# Patient Record
Sex: MALE | Race: OTHER | Employment: FULL TIME | URBAN - METROPOLITAN AREA
[De-identification: names, ages, dates, MRNs, and addresses within clinical notes are randomized per-mention and may not be internally consistent; named-entity substitution may affect disease eponyms.]

---

## 2017-01-17 ENCOUNTER — TELEPHONE (OUTPATIENT)
Dept: SURGERY | Facility: CLINIC | Age: 62
End: 2017-01-17

## 2017-01-18 NOTE — TELEPHONE ENCOUNTER
Returned pt's call and spoke with wife. She states she is the one who called. She is requesting PSA results. States her  asked her to call, as they have not heard from our clinic. Read to her result as outlined below.  She verbalized understanding a

## 2017-01-19 NOTE — TELEPHONE ENCOUNTER
I dictated and had letter printed on printer at my copy and sent nurses message that they need to send letter from that printer; I am repeating the message below.   Please make sure that staff, when they open and find instructions to send letter, that they

## 2017-05-19 ENCOUNTER — OFFICE VISIT (OUTPATIENT)
Dept: SURGERY | Facility: CLINIC | Age: 62
End: 2017-05-19

## 2017-05-19 VITALS
SYSTOLIC BLOOD PRESSURE: 110 MMHG | BODY MASS INDEX: 24.44 KG/M2 | TEMPERATURE: 98 F | HEART RATE: 54 BPM | DIASTOLIC BLOOD PRESSURE: 70 MMHG | RESPIRATION RATE: 16 BRPM | HEIGHT: 69 IN | WEIGHT: 165 LBS

## 2017-05-19 DIAGNOSIS — K40.20 BILATERAL INGUINAL HERNIA WITHOUT OBSTRUCTION OR GANGRENE, RECURRENCE NOT SPECIFIED: ICD-10-CM

## 2017-05-19 DIAGNOSIS — N40.1 BENIGN NON-NODULAR PROSTATIC HYPERPLASIA WITH LOWER URINARY TRACT SYMPTOMS: Primary | ICD-10-CM

## 2017-05-19 DIAGNOSIS — R31.29 MICROHEMATURIA: ICD-10-CM

## 2017-05-19 DIAGNOSIS — R35.1 NOCTURIA: ICD-10-CM

## 2017-05-19 PROCEDURE — 99214 OFFICE O/P EST MOD 30 MIN: CPT | Performed by: UROLOGY

## 2017-05-19 PROCEDURE — 99212 OFFICE O/P EST SF 10 MIN: CPT | Performed by: UROLOGY

## 2017-05-19 RX ORDER — TAMSULOSIN HYDROCHLORIDE 0.4 MG/1
0.8 CAPSULE ORAL DAILY
Qty: 180 CAPSULE | Refills: 3 | Status: SHIPPED | OUTPATIENT
Start: 2017-05-19 | End: 2017-06-18

## 2017-05-19 NOTE — PATIENT INSTRUCTIONS
1.  During the next 2 weeks, urine specimen for complete urinalysis and urine for cytology to investigate microscopic blood in the urine    2. Increase tamsulosin 0.4 mg to  1 capsule 2 times daily    3.   You decided not to take finasteride and we will ob

## 2017-05-21 NOTE — PROGRESS NOTES
Russell Turner is a 64year old male. HPI:   No chief complaint on file. History provided by patient and review of chart    Voiding dysfunction--he has a weak stream.  He has nocturia once a night.   Denies urinary frequency less than every 2 evaristo History:   Smoking Status: Never Smoker                      Alcohol Use: No                 Comment: occ       Medications (Active prior to today's visit):    Current Outpatient Prescriptions:  tamsulosin HCl 0.4 MG Oral Cap Take 2 capsules (0.8 mg total) blood, however, RBC 1 and WBC <1.   I explained to patient significance of microhematuria by dipstick versus number of red blood cells; I explained treatment options of reevaluating microscopy and if RBC is 3 or more per high-power field, as per guidelines screening, and he understands. He wants to get a PSA in a year. Bilateral inguinal hernias  Asymptomatic. He wants to be observed. RECOMMENDATIONS AND TREATMENT PLAN ---    1.   During the next 2 weeks, urine specimen for complete urinalysis and uri

## 2017-06-06 ENCOUNTER — APPOINTMENT (OUTPATIENT)
Dept: LAB | Facility: HOSPITAL | Age: 62
End: 2017-06-06
Attending: UROLOGY
Payer: COMMERCIAL

## 2017-06-06 DIAGNOSIS — R35.1 NOCTURIA: ICD-10-CM

## 2017-06-06 DIAGNOSIS — R31.29 MICROHEMATURIA: ICD-10-CM

## 2017-06-06 PROCEDURE — 88108 CYTOPATH CONCENTRATE TECH: CPT

## 2017-06-06 PROCEDURE — 81001 URINALYSIS AUTO W/SCOPE: CPT

## 2018-02-03 ENCOUNTER — OFFICE VISIT (OUTPATIENT)
Dept: INTERNAL MEDICINE CLINIC | Facility: CLINIC | Age: 63
End: 2018-02-03

## 2018-02-03 VITALS
WEIGHT: 184.81 LBS | DIASTOLIC BLOOD PRESSURE: 80 MMHG | TEMPERATURE: 97 F | SYSTOLIC BLOOD PRESSURE: 137 MMHG | BODY MASS INDEX: 27.37 KG/M2 | HEART RATE: 57 BPM | HEIGHT: 69 IN | RESPIRATION RATE: 20 BRPM

## 2018-02-03 DIAGNOSIS — L30.9 DERMATITIS: ICD-10-CM

## 2018-02-03 DIAGNOSIS — Z00.00 PHYSICAL EXAM, ANNUAL: Primary | ICD-10-CM

## 2018-02-03 DIAGNOSIS — H61.22 IMPACTED CERUMEN OF LEFT EAR: ICD-10-CM

## 2018-02-03 PROBLEM — I25.10 CORONARY ARTERY DISEASE INVOLVING NATIVE CORONARY ARTERY OF NATIVE HEART WITHOUT ANGINA PECTORIS: Status: ACTIVE | Noted: 2018-02-03

## 2018-02-03 PROCEDURE — 99386 PREV VISIT NEW AGE 40-64: CPT | Performed by: INTERNAL MEDICINE

## 2018-02-03 PROCEDURE — 69210 REMOVE IMPACTED EAR WAX UNI: CPT | Performed by: INTERNAL MEDICINE

## 2018-02-03 RX ORDER — BETAMETHASONE DIPROPIONATE 0.5 MG/G
CREAM TOPICAL
Qty: 45 G | Refills: 0 | Status: SHIPPED | OUTPATIENT
Start: 2018-02-03

## 2018-02-03 NOTE — PROGRESS NOTES
HPI:    Patient ID: Ngoc Cortez is a 58year old male. HPI  Patient reports that he is feeling well, he suffered MI 7 years ago and continues to see cardiologist since that time. He has no chest pain or shortness of breath.   Around [employer]:      Allergies:Not on File   /80 (BP Location: Left arm, Patient Position: Sitting)   Pulse 57   Temp (!) 97.3 °F (36.3 °C) (Oral)   Resp 20   Ht 5' 9\" (1.753 m)   Wt 184 lb 12.8 oz (83.8 kg)   BMI 27.29 kg/m²    Physical Exam    Constitutional Lipid Panel [E]      TSH W Reflex To Free T4 [E]      Flulaval 0.5 ml 6 mon and older Quad single dose PF (91500)    Meds This Visit:  Signed Prescriptions Disp Refills    betamethasone dipropionate 0.05 % External Cream 45 g 0      Sig: Apply twice a day

## 2018-03-07 ENCOUNTER — TELEPHONE (OUTPATIENT)
Dept: INTERNAL MEDICINE CLINIC | Facility: CLINIC | Age: 63
End: 2018-03-07

## 2018-03-08 NOTE — TELEPHONE ENCOUNTER
Spoke to wife, she will give message to the patient that all blood work is normal, copy of the test results mailed to the patient home address.

## 2018-12-31 ENCOUNTER — HOSPITAL ENCOUNTER (OUTPATIENT)
Dept: GENERAL RADIOLOGY | Age: 63
Discharge: HOME | End: 2018-12-31
Attending: INTERNAL MEDICINE
Payer: OTHER MISCELLANEOUS

## 2018-12-31 ENCOUNTER — OFFICE VISIT (OUTPATIENT)
Dept: INTERNAL MEDICINE CLINIC | Facility: CLINIC | Age: 63
End: 2018-12-31
Payer: OTHER MISCELLANEOUS

## 2018-12-31 VITALS
BODY MASS INDEX: 30.13 KG/M2 | DIASTOLIC BLOOD PRESSURE: 87 MMHG | TEMPERATURE: 98 F | WEIGHT: 192 LBS | SYSTOLIC BLOOD PRESSURE: 141 MMHG | HEIGHT: 67 IN | HEART RATE: 80 BPM | RESPIRATION RATE: 20 BRPM

## 2018-12-31 DIAGNOSIS — Z01.818 PREOP EXAMINATION: Primary | ICD-10-CM

## 2018-12-31 DIAGNOSIS — M67.911 DISORDER OF RIGHT ROTATOR CUFF: ICD-10-CM

## 2018-12-31 DIAGNOSIS — Z01.818 PREOP EXAMINATION: ICD-10-CM

## 2018-12-31 DIAGNOSIS — I25.10 CORONARY ARTERY DISEASE INVOLVING NATIVE CORONARY ARTERY OF NATIVE HEART WITHOUT ANGINA PECTORIS: ICD-10-CM

## 2018-12-31 PROCEDURE — 71046 X-RAY EXAM CHEST 2 VIEWS: CPT | Performed by: INTERNAL MEDICINE

## 2018-12-31 PROCEDURE — 99242 OFF/OP CONSLTJ NEW/EST SF 20: CPT | Performed by: INTERNAL MEDICINE

## 2018-12-31 PROCEDURE — 99212 OFFICE O/P EST SF 10 MIN: CPT | Performed by: INTERNAL MEDICINE

## 2018-12-31 NOTE — PROGRESS NOTES
HPI:    Patient ID: An Clemons is a 61year old male. Presents for preoperative evaluation. HPI  Patient reports that in October he suffered injury at work ,started to experience right shoulder pain.   He was evaluated by orthopedic specialist  Negative for pruritus and rash  Musculoskeletal: Positive for bone/joint symptoms  Neurological:  Negative for gait disturbance, paresthesias  Psychiatric:  Negative for inappropriate interaction and psychiatric symptoms          Current Outpatient Medicat Judgment normal.          ASSESSMENT/PLAN:   Preop examination  (primary encounter diagnosis) normal BMP and chest x-ray      Coronary artery disease without angina pectoris abnormal EKG, patient needs cardiac clearance     Rotator cuff disorder proceed wi

## 2019-01-02 ENCOUNTER — TELEPHONE (OUTPATIENT)
Dept: INTERNAL MEDICINE CLINIC | Facility: CLINIC | Age: 64
End: 2019-01-02

## 2019-01-03 NOTE — TELEPHONE ENCOUNTER
Spoke to wife, reported the chest x-ray is normal, BMP was normal EKG looked abnormal, patient has appointment with cardiologist in 2 days advised that surgery will be done only after cardiology clearance obtained.   I spoke to Ebenezer Akhtar at Dr. Macho hernandez

## 2019-01-04 ENCOUNTER — TELEPHONE (OUTPATIENT)
Dept: INTERNAL MEDICINE CLINIC | Facility: CLINIC | Age: 64
End: 2019-01-04

## 2019-01-29 ENCOUNTER — TELEPHONE (OUTPATIENT)
Dept: INTERNAL MEDICINE CLINIC | Facility: CLINIC | Age: 64
End: 2019-01-29

## 2019-01-29 NOTE — TELEPHONE ENCOUNTER
To: Dr. Violeta Simon - Workers Comp  calling about pt. Please call her back: [phone]. No forms recvd in Forms Dept.   Thank you,  [facility]

## 2019-02-04 NOTE — TELEPHONE ENCOUNTER
Spoke to Raghav Espana at [facility].  department, they ready to provide necessary information so preop evaluation Mobile gets addressed,

## 2019-02-04 NOTE — TELEPHONE ENCOUNTER
I am not in chrtge of  His care  He is under care ofo rthopedic  Specialist , I saw him only for preop visit per  Request of ortho , please give them a call

## 2019-02-05 NOTE — TELEPHONE ENCOUNTER
Spoke to Marcelino, states the [employer] comp  can call and they can help with bill for pre-op visit. States they dont call out to workers comp, case manger needs to [facility].      Spoke to Jayda, very rudely stated she is a very busy lady and castro

## 2020-11-09 ENCOUNTER — TELEPHONE (OUTPATIENT)
Dept: INTERNAL MEDICINE CLINIC | Facility: CLINIC | Age: 65
End: 2020-11-09

## 2020-11-09 NOTE — TELEPHONE ENCOUNTER
Wife calling for spouse. Wife was able to schedule a video visit for herself but states  is at work all day with no break period which will allow him to schedule a video visit for himself. Wife and  would like to rule out Covid.  Per spouse, p

## 2020-11-09 NOTE — TELEPHONE ENCOUNTER
I have not seen this patient in almost 2 years, and I would like to have D[employer]im[employer] visit with him.   He can go to any community testing centers at his convenience, testing for [employer] usually happens during the day, if he cannot take time off work f

## 2020-11-10 NOTE — TELEPHONE ENCOUNTER
Spoke to Ana and she states he is headed to the ER to [facility]. He fainted at work and he will be seen there.      ROSALVA

## 2020-11-13 ENCOUNTER — TELEPHONE (OUTPATIENT)
Dept: INTERNAL MEDICINE CLINIC | Facility: CLINIC | Age: 65
End: 2020-11-13

## 2020-11-13 NOTE — TELEPHONE ENCOUNTER
Spoke to wife  , gave pt appt  For  Friday  4;40pm, mailed orsed  For labs  And MRI  BRAIN, advised to see neurologist if  Dizziness continues

## 2020-12-19 ENCOUNTER — TELEPHONE (OUTPATIENT)
Dept: INTERNAL MEDICINE CLINIC | Facility: CLINIC | Age: 65
End: 2020-12-19

## 2021-01-25 ENCOUNTER — TELEPHONE (OUTPATIENT)
Dept: INTERNAL MEDICINE CLINIC | Facility: CLINIC | Age: 66
End: 2021-01-25

## 2021-01-25 NOTE — TELEPHONE ENCOUNTER
Spoke with pt.relayed Dr. Gonzalo Beckett results note:  Negative test, no blood in stool . Pt. Wife Marsha Fragoso verbalized understanding.

## 2023-02-20 ENCOUNTER — OFFICE VISIT (OUTPATIENT)
Dept: INTERNAL MEDICINE CLINIC | Facility: CLINIC | Age: 68
End: 2023-02-20

## 2023-02-20 VITALS
SYSTOLIC BLOOD PRESSURE: 130 MMHG | WEIGHT: 193 LBS | DIASTOLIC BLOOD PRESSURE: 78 MMHG | HEART RATE: 68 BPM | HEIGHT: 66 IN | BODY MASS INDEX: 31.02 KG/M2

## 2023-02-20 DIAGNOSIS — H61.22 HEARING LOSS OF LEFT EAR DUE TO CERUMEN IMPACTION: ICD-10-CM

## 2023-02-20 DIAGNOSIS — I25.10 CORONARY ARTERY DISEASE INVOLVING NATIVE CORONARY ARTERY OF NATIVE HEART WITHOUT ANGINA PECTORIS: Primary | ICD-10-CM

## 2023-02-20 DIAGNOSIS — E78.49 OTHER HYPERLIPIDEMIA: ICD-10-CM

## 2023-02-20 DIAGNOSIS — H91.8X1 OTHER HEARING LOSS OF RIGHT EAR WITH UNRESTRICTED HEARING OF LEFT EAR: ICD-10-CM

## 2023-02-20 DIAGNOSIS — Z12.5 PROSTATE CANCER SCREENING: ICD-10-CM

## 2023-02-20 DIAGNOSIS — M72.2 PLANTAR FASCIAL FIBROMATOSIS: ICD-10-CM

## 2023-02-20 PROBLEM — I25.84 CORONARY ARTERY DISEASE DUE TO CALCIFIED CORONARY LESION: Status: ACTIVE | Noted: 2018-02-03

## 2023-02-20 PROCEDURE — 90677 PCV20 VACCINE IM: CPT | Performed by: INTERNAL MEDICINE

## 2023-02-20 PROCEDURE — 99203 OFFICE O/P NEW LOW 30 MIN: CPT | Performed by: INTERNAL MEDICINE

## 2023-02-20 PROCEDURE — 1126F AMNT PAIN NOTED NONE PRSNT: CPT | Performed by: INTERNAL MEDICINE

## 2023-02-20 PROCEDURE — G0009 ADMIN PNEUMOCOCCAL VACCINE: HCPCS | Performed by: INTERNAL MEDICINE

## 2023-02-20 PROCEDURE — 3075F SYST BP GE 130 - 139MM HG: CPT | Performed by: INTERNAL MEDICINE

## 2023-02-20 PROCEDURE — 3078F DIAST BP <80 MM HG: CPT | Performed by: INTERNAL MEDICINE

## 2023-02-20 PROCEDURE — 3008F BODY MASS INDEX DOCD: CPT | Performed by: INTERNAL MEDICINE

## 2023-02-21 LAB
ABSOLUTE BASOPHILS: 62 CELLS/UL (ref 0–200)
ABSOLUTE EOSINOPHILS: 168 CELLS/UL (ref 15–500)
ABSOLUTE LYMPHOCYTES: 1758 CELLS/UL (ref 850–3900)
ABSOLUTE MONOCYTES: 426 CELLS/UL (ref 200–950)
ABSOLUTE NEUTROPHILS: 3186 CELLS/UL (ref 1500–7800)
ALBUMIN/GLOBULIN RATIO: 1.6 (CALC) (ref 1–2.5)
ALBUMIN: 4.2 G/DL (ref 3.6–5.1)
ALKALINE PHOSPHATASE: 57 U/L (ref 35–144)
ALT: 28 U/L (ref 9–46)
AST: 26 U/L (ref 10–35)
BASOPHILS: 1.1 %
BILIRUBIN, TOTAL: 0.6 MG/DL (ref 0.2–1.2)
BUN: 20 MG/DL (ref 7–25)
CALCIUM: 9.2 MG/DL (ref 8.6–10.3)
CARBON DIOXIDE: 28 MMOL/L (ref 20–32)
CHLORIDE: 105 MMOL/L (ref 98–110)
CHOL/HDLC RATIO: 2.8 (CALC)
CHOLESTEROL, TOTAL: 120 MG/DL
CREATININE: 1.03 MG/DL (ref 0.7–1.35)
EGFR: 80 ML/MIN/1.73M2
EOSINOPHILS: 3 %
GLOBULIN: 2.6 G/DL (CALC) (ref 1.9–3.7)
GLUCOSE: 91 MG/DL (ref 65–99)
HDL CHOLESTEROL: 43 MG/DL
HEMATOCRIT: 45.4 % (ref 38.5–50)
HEMOGLOBIN: 15.3 G/DL (ref 13.2–17.1)
LDL-CHOLESTEROL: 56 MG/DL (CALC)
LYMPHOCYTES: 31.4 %
MCH: 30.8 PG (ref 27–33)
MCHC: 33.7 G/DL (ref 32–36)
MCV: 91.3 FL (ref 80–100)
MONOCYTES: 7.6 %
MPV: 9.6 FL (ref 7.5–12.5)
NEUTROPHILS: 56.9 %
NON-HDL CHOLESTEROL: 77 MG/DL (CALC)
PLATELET COUNT: 257 THOUSAND/UL (ref 140–400)
POTASSIUM: 4.5 MMOL/L (ref 3.5–5.3)
PROTEIN, TOTAL: 6.8 G/DL (ref 6.1–8.1)
RDW: 12.5 % (ref 11–15)
RED BLOOD CELL COUNT: 4.97 MILLION/UL (ref 4.2–5.8)
SODIUM: 139 MMOL/L (ref 135–146)
TOTAL PSA: 2.5 NG/ML
TRIGLYCERIDES: 133 MG/DL
WHITE BLOOD CELL COUNT: 5.6 THOUSAND/UL (ref 3.8–10.8)

## 2023-05-04 ENCOUNTER — TELEPHONE (OUTPATIENT)
Dept: INTERNAL MEDICINE CLINIC | Facility: CLINIC | Age: 68
End: 2023-05-04

## 2023-05-04 NOTE — TELEPHONE ENCOUNTER
Spoke to patient's wife regarding patient's cologuard negative result. Patient's wife verbalized understanding and states patient knew result already but will still be informing him of phone call.

## 2023-10-30 ENCOUNTER — OFFICE VISIT (OUTPATIENT)
Dept: INTERNAL MEDICINE CLINIC | Facility: CLINIC | Age: 68
End: 2023-10-30
Payer: MEDICARE

## 2023-10-30 VITALS
WEIGHT: 245 LBS | OXYGEN SATURATION: 98 % | HEART RATE: 70 BPM | DIASTOLIC BLOOD PRESSURE: 84 MMHG | BODY MASS INDEX: 37.13 KG/M2 | SYSTOLIC BLOOD PRESSURE: 124 MMHG | HEIGHT: 68 IN

## 2023-10-30 DIAGNOSIS — I25.10 CORONARY ARTERY DISEASE INVOLVING NATIVE CORONARY ARTERY OF NATIVE HEART WITHOUT ANGINA PECTORIS: ICD-10-CM

## 2023-10-30 DIAGNOSIS — Z00.00 MEDICARE ANNUAL WELLNESS VISIT, SUBSEQUENT: Primary | ICD-10-CM

## 2023-10-30 DIAGNOSIS — K40.90 NON-RECURRENT UNILATERAL INGUINAL HERNIA WITHOUT OBSTRUCTION OR GANGRENE: ICD-10-CM

## 2023-10-30 DIAGNOSIS — E78.49 OTHER HYPERLIPIDEMIA: ICD-10-CM

## 2023-10-30 DIAGNOSIS — H91.8X1 OTHER HEARING LOSS OF RIGHT EAR WITH UNRESTRICTED HEARING OF LEFT EAR: ICD-10-CM

## 2023-10-30 PROCEDURE — 1125F AMNT PAIN NOTED PAIN PRSNT: CPT | Performed by: INTERNAL MEDICINE

## 2023-10-30 PROCEDURE — 3079F DIAST BP 80-89 MM HG: CPT | Performed by: INTERNAL MEDICINE

## 2023-10-30 PROCEDURE — 96160 PT-FOCUSED HLTH RISK ASSMT: CPT | Performed by: INTERNAL MEDICINE

## 2023-10-30 PROCEDURE — 3074F SYST BP LT 130 MM HG: CPT | Performed by: INTERNAL MEDICINE

## 2023-10-30 PROCEDURE — G0438 PPPS, INITIAL VISIT: HCPCS | Performed by: INTERNAL MEDICINE

## 2023-10-30 PROCEDURE — 3008F BODY MASS INDEX DOCD: CPT | Performed by: INTERNAL MEDICINE

## 2023-10-30 PROCEDURE — 1159F MED LIST DOCD IN RCRD: CPT | Performed by: INTERNAL MEDICINE

## 2023-10-30 PROCEDURE — 1170F FXNL STATUS ASSESSED: CPT | Performed by: INTERNAL MEDICINE

## 2023-11-07 ENCOUNTER — OFFICE VISIT (OUTPATIENT)
Dept: SURGERY | Facility: CLINIC | Age: 68
End: 2023-11-07

## 2023-11-07 VITALS — BODY MASS INDEX: 37 KG/M2 | WEIGHT: 245 LBS

## 2023-11-07 DIAGNOSIS — K40.90 NON-RECURRENT UNILATERAL INGUINAL HERNIA WITHOUT OBSTRUCTION OR GANGRENE: Primary | ICD-10-CM

## 2023-11-07 PROCEDURE — 1160F RVW MEDS BY RX/DR IN RCRD: CPT | Performed by: SURGERY

## 2023-11-07 PROCEDURE — 1159F MED LIST DOCD IN RCRD: CPT | Performed by: SURGERY

## 2023-11-07 PROCEDURE — 99202 OFFICE O/P NEW SF 15 MIN: CPT | Performed by: SURGERY

## 2023-11-07 PROCEDURE — 1125F AMNT PAIN NOTED PAIN PRSNT: CPT | Performed by: SURGERY

## 2023-11-20 ENCOUNTER — TELEPHONE (OUTPATIENT)
Dept: SURGERY | Facility: CLINIC | Age: 68
End: 2023-11-20

## 2023-12-01 PROBLEM — I21.9 HEART ATTACK (HCC): Status: ACTIVE | Noted: 2023-12-01

## 2023-12-01 RX ORDER — CHOLECALCIFEROL (VITAMIN D3) 125 MCG
2000 CAPSULE ORAL DAILY
COMMUNITY

## 2023-12-02 LAB
ABSOLUTE BASOPHILS: 73 CELLS/UL (ref 0–200)
ABSOLUTE EOSINOPHILS: 213 CELLS/UL (ref 15–500)
ABSOLUTE LYMPHOCYTES: 2369 CELLS/UL (ref 850–3900)
ABSOLUTE MONOCYTES: 442 CELLS/UL (ref 200–950)
ABSOLUTE NEUTROPHILS: 2503 CELLS/UL (ref 1500–7800)
ALBUMIN/GLOBULIN RATIO: 1.6 (CALC) (ref 1–2.5)
ALBUMIN: 3.9 G/DL (ref 3.6–5.1)
ALKALINE PHOSPHATASE: 59 U/L (ref 35–144)
ALT: 24 U/L (ref 9–46)
AST: 23 U/L (ref 10–35)
BASOPHILS: 1.3 %
BILIRUBIN, TOTAL: 0.4 MG/DL (ref 0.2–1.2)
BUN: 18 MG/DL (ref 7–25)
CALCIUM: 9.9 MG/DL (ref 8.6–10.3)
CARBON DIOXIDE: 28 MMOL/L (ref 20–32)
CHLORIDE: 106 MMOL/L (ref 98–110)
CHOL/HDLC RATIO: 2.7 (CALC)
CHOLESTEROL, TOTAL: 113 MG/DL
CREATININE: 0.98 MG/DL (ref 0.7–1.35)
EGFR: 84 ML/MIN/1.73M2
EOSINOPHILS: 3.8 %
GLOBULIN: 2.5 G/DL (CALC) (ref 1.9–3.7)
GLUCOSE: 95 MG/DL (ref 65–139)
HDL CHOLESTEROL: 42 MG/DL
HEMATOCRIT: 44.2 % (ref 38.5–50)
HEMOGLOBIN: 14.4 G/DL (ref 13.2–17.1)
LDL-CHOLESTEROL: 51 MG/DL (CALC)
LYMPHOCYTES: 42.3 %
MCH: 30.4 PG (ref 27–33)
MCHC: 32.6 G/DL (ref 32–36)
MCV: 93.2 FL (ref 80–100)
MONOCYTES: 7.9 %
MPV: 9.7 FL (ref 7.5–12.5)
NEUTROPHILS: 44.7 %
NON-HDL CHOLESTEROL: 71 MG/DL (CALC)
PLATELET COUNT: 238 THOUSAND/UL (ref 140–400)
POTASSIUM: 4.5 MMOL/L (ref 3.5–5.3)
PROTEIN, TOTAL: 6.4 G/DL (ref 6.1–8.1)
RDW: 12.7 % (ref 11–15)
RED BLOOD CELL COUNT: 4.74 MILLION/UL (ref 4.2–5.8)
SODIUM: 141 MMOL/L (ref 135–146)
TRIGLYCERIDES: 123 MG/DL
WHITE BLOOD CELL COUNT: 5.6 THOUSAND/UL (ref 3.8–10.8)

## 2023-12-06 ENCOUNTER — HOSPITAL ENCOUNTER (OUTPATIENT)
Facility: HOSPITAL | Age: 68
Setting detail: HOSPITAL OUTPATIENT SURGERY
Discharge: HOME | End: 2023-12-06
Attending: SURGERY | Admitting: SURGERY
Payer: MEDICARE

## 2023-12-06 ENCOUNTER — ANESTHESIA (OUTPATIENT)
Dept: SURGERY | Facility: HOSPITAL | Age: 68
End: 2023-12-06
Payer: MEDICARE

## 2023-12-06 ENCOUNTER — ANESTHESIA EVENT (OUTPATIENT)
Dept: SURGERY | Facility: HOSPITAL | Age: 68
End: 2023-12-06
Payer: MEDICARE

## 2023-12-06 VITALS
DIASTOLIC BLOOD PRESSURE: 79 MMHG | OXYGEN SATURATION: 98 % | RESPIRATION RATE: 12 BRPM | TEMPERATURE: 98 F | HEART RATE: 49 BPM | BODY MASS INDEX: 30.13 KG/M2 | HEIGHT: 67 IN | SYSTOLIC BLOOD PRESSURE: 134 MMHG | WEIGHT: 192 LBS

## 2023-12-06 PROCEDURE — 49505 PRP I/HERN INIT REDUC >5 YR: CPT | Performed by: SURGERY

## 2023-12-06 PROCEDURE — 0YU50JZ SUPPLEMENT RIGHT INGUINAL REGION WITH SYNTHETIC SUBSTITUTE, OPEN APPROACH: ICD-10-PCS | Performed by: SURGERY

## 2023-12-06 DEVICE — BARD MESH
Type: IMPLANTABLE DEVICE | Status: FUNCTIONAL
Brand: BARD MESH

## 2023-12-06 RX ORDER — FAMOTIDINE 20 MG/1
20 TABLET, FILM COATED ORAL ONCE
Status: COMPLETED | OUTPATIENT
Start: 2023-12-06 | End: 2023-12-06

## 2023-12-06 RX ORDER — NALOXONE HYDROCHLORIDE 0.4 MG/ML
0.08 INJECTION, SOLUTION INTRAMUSCULAR; INTRAVENOUS; SUBCUTANEOUS AS NEEDED
OUTPATIENT
Start: 2023-12-06 | End: 2023-12-06

## 2023-12-06 RX ORDER — MORPHINE SULFATE 10 MG/ML
6 INJECTION, SOLUTION INTRAMUSCULAR; INTRAVENOUS EVERY 10 MIN PRN
OUTPATIENT
Start: 2023-12-06

## 2023-12-06 RX ORDER — DEXAMETHASONE SODIUM PHOSPHATE 4 MG/ML
VIAL (ML) INJECTION AS NEEDED
Status: DISCONTINUED | OUTPATIENT
Start: 2023-12-06 | End: 2023-12-06 | Stop reason: SURG

## 2023-12-06 RX ORDER — SODIUM CHLORIDE, SODIUM LACTATE, POTASSIUM CHLORIDE, CALCIUM CHLORIDE 600; 310; 30; 20 MG/100ML; MG/100ML; MG/100ML; MG/100ML
INJECTION, SOLUTION INTRAVENOUS CONTINUOUS
Status: DISCONTINUED | OUTPATIENT
Start: 2023-12-06 | End: 2023-12-06

## 2023-12-06 RX ORDER — BUPIVACAINE HYDROCHLORIDE 5 MG/ML
INJECTION, SOLUTION EPIDURAL; INTRACAUDAL AS NEEDED
Status: DISCONTINUED | OUTPATIENT
Start: 2023-12-06 | End: 2023-12-06 | Stop reason: HOSPADM

## 2023-12-06 RX ORDER — HYDROMORPHONE HYDROCHLORIDE 1 MG/ML
0.2 INJECTION, SOLUTION INTRAMUSCULAR; INTRAVENOUS; SUBCUTANEOUS EVERY 5 MIN PRN
OUTPATIENT
Start: 2023-12-06

## 2023-12-06 RX ORDER — HYDROMORPHONE HYDROCHLORIDE 1 MG/ML
0.4 INJECTION, SOLUTION INTRAMUSCULAR; INTRAVENOUS; SUBCUTANEOUS EVERY 5 MIN PRN
OUTPATIENT
Start: 2023-12-06

## 2023-12-06 RX ORDER — SODIUM CHLORIDE, SODIUM LACTATE, POTASSIUM CHLORIDE, CALCIUM CHLORIDE 600; 310; 30; 20 MG/100ML; MG/100ML; MG/100ML; MG/100ML
INJECTION, SOLUTION INTRAVENOUS CONTINUOUS
OUTPATIENT
Start: 2023-12-06

## 2023-12-06 RX ORDER — ONDANSETRON 2 MG/ML
INJECTION INTRAMUSCULAR; INTRAVENOUS AS NEEDED
Status: DISCONTINUED | OUTPATIENT
Start: 2023-12-06 | End: 2023-12-06 | Stop reason: SURG

## 2023-12-06 RX ORDER — ROCURONIUM BROMIDE 10 MG/ML
INJECTION, SOLUTION INTRAVENOUS AS NEEDED
Status: DISCONTINUED | OUTPATIENT
Start: 2023-12-06 | End: 2023-12-06 | Stop reason: SURG

## 2023-12-06 RX ORDER — ACETAMINOPHEN AND CODEINE PHOSPHATE 300; 30 MG/1; MG/1
1-2 TABLET ORAL EVERY 4 HOURS PRN
Qty: 30 TABLET | Refills: 0 | Status: SHIPPED | OUTPATIENT
Start: 2023-12-06

## 2023-12-06 RX ORDER — METOCLOPRAMIDE HYDROCHLORIDE 5 MG/ML
10 INJECTION INTRAMUSCULAR; INTRAVENOUS EVERY 8 HOURS PRN
OUTPATIENT
Start: 2023-12-06

## 2023-12-06 RX ORDER — MORPHINE SULFATE 4 MG/ML
4 INJECTION, SOLUTION INTRAMUSCULAR; INTRAVENOUS EVERY 10 MIN PRN
OUTPATIENT
Start: 2023-12-06

## 2023-12-06 RX ORDER — ONDANSETRON 2 MG/ML
4 INJECTION INTRAMUSCULAR; INTRAVENOUS EVERY 6 HOURS PRN
OUTPATIENT
Start: 2023-12-06

## 2023-12-06 RX ORDER — POLYETHYLENE GLYCOL 3350 17 G/17G
17 POWDER, FOR SOLUTION ORAL DAILY
Qty: 14 PACKET | Refills: 0 | Status: SHIPPED | OUTPATIENT
Start: 2023-12-06 | End: 2023-12-20

## 2023-12-06 RX ORDER — GLYCOPYRROLATE 0.2 MG/ML
INJECTION, SOLUTION INTRAMUSCULAR; INTRAVENOUS AS NEEDED
Status: DISCONTINUED | OUTPATIENT
Start: 2023-12-06 | End: 2023-12-06 | Stop reason: SURG

## 2023-12-06 RX ORDER — EPHEDRINE SULFATE 50 MG/ML
INJECTION, SOLUTION INTRAVENOUS AS NEEDED
Status: DISCONTINUED | OUTPATIENT
Start: 2023-12-06 | End: 2023-12-06 | Stop reason: SURG

## 2023-12-06 RX ORDER — ACETAMINOPHEN 500 MG
1000 TABLET ORAL ONCE
Status: COMPLETED | OUTPATIENT
Start: 2023-12-06 | End: 2023-12-06

## 2023-12-06 RX ORDER — METOCLOPRAMIDE HYDROCHLORIDE 5 MG/ML
10 INJECTION INTRAMUSCULAR; INTRAVENOUS ONCE
Status: COMPLETED | OUTPATIENT
Start: 2023-12-06 | End: 2023-12-06

## 2023-12-06 RX ORDER — MORPHINE SULFATE 2 MG/ML
2 INJECTION, SOLUTION INTRAMUSCULAR; INTRAVENOUS EVERY 10 MIN PRN
OUTPATIENT
Start: 2023-12-06

## 2023-12-06 RX ORDER — CEFAZOLIN SODIUM/WATER 2 G/20 ML
2 SYRINGE (ML) INTRAVENOUS ONCE
Status: COMPLETED | OUTPATIENT
Start: 2023-12-06 | End: 2023-12-06

## 2023-12-06 RX ORDER — METOCLOPRAMIDE 10 MG/1
10 TABLET ORAL ONCE
Status: COMPLETED | OUTPATIENT
Start: 2023-12-06 | End: 2023-12-06

## 2023-12-06 RX ORDER — HYDROMORPHONE HYDROCHLORIDE 1 MG/ML
0.6 INJECTION, SOLUTION INTRAMUSCULAR; INTRAVENOUS; SUBCUTANEOUS EVERY 5 MIN PRN
OUTPATIENT
Start: 2023-12-06

## 2023-12-06 RX ORDER — NEOSTIGMINE METHYLSULFATE 1 MG/ML
INJECTION, SOLUTION INTRAVENOUS AS NEEDED
Status: DISCONTINUED | OUTPATIENT
Start: 2023-12-06 | End: 2023-12-06 | Stop reason: SURG

## 2023-12-06 RX ORDER — FAMOTIDINE 10 MG/ML
20 INJECTION, SOLUTION INTRAVENOUS ONCE
Status: COMPLETED | OUTPATIENT
Start: 2023-12-06 | End: 2023-12-06

## 2023-12-06 RX ADMIN — CEFAZOLIN SODIUM/WATER 2 G: 2 G/20 ML SYRINGE (ML) INTRAVENOUS at 13:04:00

## 2023-12-06 RX ADMIN — ONDANSETRON 4 MG: 2 INJECTION INTRAMUSCULAR; INTRAVENOUS at 13:04:00

## 2023-12-06 RX ADMIN — NEOSTIGMINE METHYLSULFATE 5 MG: 1 INJECTION, SOLUTION INTRAVENOUS at 13:58:00

## 2023-12-06 RX ADMIN — ROCURONIUM BROMIDE 40 MG: 10 INJECTION, SOLUTION INTRAVENOUS at 12:57:00

## 2023-12-06 RX ADMIN — GLYCOPYRROLATE 0.8 MG: 0.2 INJECTION, SOLUTION INTRAMUSCULAR; INTRAVENOUS at 13:58:00

## 2023-12-06 RX ADMIN — EPHEDRINE SULFATE 5 MG: 50 INJECTION, SOLUTION INTRAVENOUS at 13:44:00

## 2023-12-06 RX ADMIN — DEXAMETHASONE SODIUM PHOSPHATE 4 MG: 4 MG/ML VIAL (ML) INJECTION at 13:04:00

## 2023-12-06 NOTE — OPERATIVE REPORT
[facility]     Operative Report    Patient Name:  Patricio Casas  MR:  [id number]  :  1955  DOS:  23    Preop Dx:  Non-recurrent unilateral inguinal hernia without obstruction or gangrene [K40.90]  Postop Dx:  Non-recurrent unilateral inguinal hernia without obstruction or gangrene [K40.90]  Procedure:  Right inguinal hernia repair with mesh  Surgeon:  Huber Piedra MD  Surgical Assistant.: Rupa Sapp CSA, Claudell Sport, MD  EBL: 5 ml  Complication:  None    INDICATION:  Pt is a 76year old male who with a symptomatic Non-recurrent unilateral inguinal hernia without obstruction or gangrene [K40.90] who is scheduled for a Hernia inguinal repair adult. CONSENT:  An informed consent discussion was held with the patient regarding the nature of inguinal hernias, the treatment options, expected outcomes, risks and complications. These risks include but are not limited to bleeding, wound infection, mesh infection, bowel injury, injury to the spermatic cord structures including the vas deferens, chronic groin pain due to nerve injury or entrapment, and hernia recurrence. The patient expressed understanding and agreed to proceed with an open inguinal hernia repair with mesh. TECHNIQUE:  The patient was taken to the operating room and placed in supine position. GET was administered and the safety check was performed. IV antibiotic was given and the abdomen was prepped and draped in standard fashion. An Ioban drape was placed on the lower abdomen. SCD per placed on the calves for DVT prophylaxis. A solution of 0.5% marcaine was used to infiltrate the skin and subcutaneous tissue overlying the right inguinal canal.  An oblique incision was made over the right inguinal canal and the external oblique was identified. The external ring was opened and the cord structures were mobilized from the pubic tubercle and secured using a penrose drain.   Care was taken not to injure the ilioinguinal nerve during the dissection. The cord was dissected to identify the hernia sac as well as the cord lipoma. A high dissection of the hernia sac was performed. I opened the hernia sac to find incarcerated omentum. I freed the adhesions and reduced the omentum into the peritoneal cavity. The hernia sac was closed with 2-0 vicryl and reduced below the internal ring. The inguinal floor was obliterated with a portion of the hernia sac. I temporarily closed the floor using 3-0 PDS. We proceeded to fix the inguinal hernia using the Ramon technique. A piece of polypropylene mesh was cut to an appropriate size and sutured to the pubic tubercle using an interrupted 2-0 prolene. Additional interrupted 2-0 prolenes were used to secure the mesh to the conjoined tendon medially and the shelving edge of the inguinal ligament laterally. The leaflets of the mesh was wrapped around the cord to re-create the internal ring and tucked under the external oblique laterally. The mesh repair was adequate without tension. We irrigated the wound with saline and hemostasis was obtained. There were no injury to the Vas deferens or the other cord structures during our dissection. The external oblique was closed using 2-0 vicryl to re-create the external ring. The subcutaneous tissue and skin were closed using 3-0 and 4-0 vicryl. The patient was stable throughout the procedure. All instrument and sponge counts were correct at the end of the case. I was present during the critical portions of the procedure.     Denae Maddox MD

## 2023-12-06 NOTE — ANESTHESIA PROCEDURE NOTES
Airway  Date/Time: 12/6/2023 1:00 PM  Urgency: Elective      General Information and Staff    Patient location during procedure: OR  Anesthesiologist: Ailin Herron MD  Performed: anesthesiologist   Performed by: Ailin Herron MD  Authorized by: Ailin Herron MD      Indications and Patient Condition  Indications for airway management: anesthesia  Sedation level: deep  Preoxygenated: yes  Patient position: sniffing  Mask difficulty assessment: 1 - vent by mask    Final Airway Details  Final airway type: endotracheal airway      Successful airway: ETT  Cuffed: yes   Successful intubation technique: direct laryngoscopy  Endotracheal tube insertion site: oral  Blade: Ramesh  Blade size: #4  ETT size (mm): 7.0    Cormack-Lehane Classification: grade I - full view of glottis  Placement verified by: capnometry   Measured from: teeth  Number of attempts at approach: 1

## 2023-12-06 NOTE — INTERVAL H&P NOTE
Pre-op Diagnosis: Non-recurrent unilateral inguinal hernia without obstruction or gangrene [K40.90]    The above referenced H&P was reviewed by Brian Rucker MD on 12/6/2023, the patient was examined and no significant changes have occurred in the patient's condition since the H&P was performed. I discussed with the patient and/or legal representative the potential benefits, risks and side effects of this procedure; the likelihood of the patient achieving goals; and potential problems that might occur during recuperation. I discussed reasonable alternatives to the procedure, including risks, benefits and side effects related to the alternatives and risks related to not receiving this procedure. We will proceed with procedure as planned.

## 2023-12-18 PROBLEM — Z09 POSTOPERATIVE EXAMINATION: Status: ACTIVE | Noted: 2023-12-18

## (undated) DEVICE — SUTURE SZ 2-0 L18IN ABSRB SUTUPAK PRE-CUT STR

## (undated) DEVICE — DRAPE SHEET LAPCHOLE 124X100X7

## (undated) DEVICE — VIOLET BRAIDED (POLYGLACTIN 910), SYNTHETIC ABSORBABLE SUTURE: Brand: COATED VICRYL

## (undated) DEVICE — MINOR GENERAL: Brand: MEDLINE INDUSTRIES, INC.

## (undated) DEVICE — UNDYED BRAIDED (POLYGLACTIN 910), SYNTHETIC ABSORBABLE SUTURE: Brand: COATED VICRYL

## (undated) DEVICE — PENROSE TUBING RADIOPAQUE: Brand: ARGYLE

## (undated) DEVICE — GAMMEX® PI HYBRID SIZE 7.5, STERILE POWDER-FREE SURGICAL GLOVE, POLYISOPRENE AND NEOPRENE BLEND: Brand: GAMMEX

## (undated) DEVICE — SOLUTION IRRIG 1000ML 0.9% NACL USP BTL

## (undated) DEVICE — 3M™ IOBAN™ 2 ANTIMICROBIAL INCISE DRAPE 6650EZ: Brand: IOBAN™ 2

## (undated) DEVICE — ADHESIVE SKIN TOP FOR WND CLSR DERMBND ADV

## (undated) DEVICE — SUTURE PROL SZ 2-0 L30IN NONABSORB BLU

## (undated) DEVICE — SUTURE PDS II SZ 3-0 L27IN ABSRB VLT L26MM SH

## (undated) NOTE — LETTER
3/7/2018              [address]         Dear Fortino Lee,    Your recent blood test normal.  I enclosed a copy of your test results, please give me a call if you have any questions.

## (undated) NOTE — MR AVS SNAPSHOT
Jackie  [address]  [phone]               Thank you for choosing us for your health care visit with Angelina Woods MD.  We are glad to serve you and happy to provide you with this summ 1.  During the next 2 weeks, urine specimen for complete urinalysis and urine for cytology to investigate microscopic blood in the urine    2. Increase tamsulosin 0.4 mg to  1 capsule 2 times daily    3.   You decided not to take finasteride and we will ob Tye.tn